# Patient Record
Sex: FEMALE | Race: WHITE | NOT HISPANIC OR LATINO | Employment: UNEMPLOYED | URBAN - METROPOLITAN AREA
[De-identification: names, ages, dates, MRNs, and addresses within clinical notes are randomized per-mention and may not be internally consistent; named-entity substitution may affect disease eponyms.]

---

## 2022-01-10 ENCOUNTER — LAB VISIT (OUTPATIENT)
Dept: PRIMARY CARE CLINIC | Facility: OTHER | Age: 52
End: 2022-01-10
Attending: INTERNAL MEDICINE
Payer: COMMERCIAL

## 2022-01-10 DIAGNOSIS — Z20.822 ENCOUNTER FOR LABORATORY TESTING FOR COVID-19 VIRUS: ICD-10-CM

## 2022-01-10 PROCEDURE — U0003 INFECTIOUS AGENT DETECTION BY NUCLEIC ACID (DNA OR RNA); SEVERE ACUTE RESPIRATORY SYNDROME CORONAVIRUS 2 (SARS-COV-2) (CORONAVIRUS DISEASE [COVID-19]), AMPLIFIED PROBE TECHNIQUE, MAKING USE OF HIGH THROUGHPUT TECHNOLOGIES AS DESCRIBED BY CMS-2020-01-R: HCPCS | Performed by: INTERNAL MEDICINE

## 2022-01-12 ENCOUNTER — PATIENT MESSAGE (OUTPATIENT)
Dept: ADMINISTRATIVE | Facility: OTHER | Age: 52
End: 2022-01-12
Payer: COMMERCIAL

## 2022-01-12 LAB
SARS-COV-2 RNA RESP QL NAA+PROBE: DETECTED
SARS-COV-2- CYCLE NUMBER: 28

## 2022-03-02 ENCOUNTER — PATIENT MESSAGE (OUTPATIENT)
Dept: RESEARCH | Facility: HOSPITAL | Age: 52
End: 2022-03-02
Payer: COMMERCIAL

## 2022-09-06 ENCOUNTER — OFFICE VISIT (OUTPATIENT)
Dept: DERMATOLOGY | Facility: CLINIC | Age: 52
End: 2022-09-06
Payer: COMMERCIAL

## 2022-09-06 DIAGNOSIS — L65.9 ALOPECIA: Primary | ICD-10-CM

## 2022-09-06 PROCEDURE — 99499 UNLISTED E&M SERVICE: CPT | Mod: S$GLB,,, | Performed by: DERMATOLOGY

## 2022-09-06 PROCEDURE — 88312 PR  SPECIAL STAINS,GROUP I: ICD-10-PCS | Mod: 26,,, | Performed by: DERMATOLOGY

## 2022-09-06 PROCEDURE — 88305 TISSUE EXAM BY PATHOLOGIST: CPT | Performed by: DERMATOLOGY

## 2022-09-06 PROCEDURE — 1159F PR MEDICATION LIST DOCUMENTED IN MEDICAL RECORD: ICD-10-PCS | Mod: CPTII,S$GLB,, | Performed by: DERMATOLOGY

## 2022-09-06 PROCEDURE — 88313 PR  SPECIAL STAINS,GROUP II: ICD-10-PCS | Mod: 26,,, | Performed by: DERMATOLOGY

## 2022-09-06 PROCEDURE — 88312 SPECIAL STAINS GROUP 1: CPT | Mod: 26,,, | Performed by: DERMATOLOGY

## 2022-09-06 PROCEDURE — 99999 PR PBB SHADOW E&M-EST. PATIENT-LVL III: ICD-10-PCS | Mod: PBBFAC,,, | Performed by: DERMATOLOGY

## 2022-09-06 PROCEDURE — 88305 TISSUE EXAM BY PATHOLOGIST: CPT | Mod: 26,,, | Performed by: DERMATOLOGY

## 2022-09-06 PROCEDURE — 88313 SPECIAL STAINS GROUP 2: CPT | Mod: 59 | Performed by: DERMATOLOGY

## 2022-09-06 PROCEDURE — 11104 PUNCH BX SKIN SINGLE LESION: CPT | Mod: S$GLB,,, | Performed by: DERMATOLOGY

## 2022-09-06 PROCEDURE — 99499 NO LOS: ICD-10-PCS | Mod: S$GLB,,, | Performed by: DERMATOLOGY

## 2022-09-06 PROCEDURE — 1160F RVW MEDS BY RX/DR IN RCRD: CPT | Mod: CPTII,S$GLB,, | Performed by: DERMATOLOGY

## 2022-09-06 PROCEDURE — 1159F MED LIST DOCD IN RCRD: CPT | Mod: CPTII,S$GLB,, | Performed by: DERMATOLOGY

## 2022-09-06 PROCEDURE — 99999 PR PBB SHADOW E&M-EST. PATIENT-LVL III: CPT | Mod: PBBFAC,,, | Performed by: DERMATOLOGY

## 2022-09-06 PROCEDURE — 1160F PR REVIEW ALL MEDS BY PRESCRIBER/CLIN PHARMACIST DOCUMENTED: ICD-10-PCS | Mod: CPTII,S$GLB,, | Performed by: DERMATOLOGY

## 2022-09-06 PROCEDURE — 88313 SPECIAL STAINS GROUP 2: CPT | Mod: 26,,, | Performed by: DERMATOLOGY

## 2022-09-06 PROCEDURE — 11104 PR PUNCH BIOPSY, SKIN, SINGLE LESION: ICD-10-PCS | Mod: S$GLB,,, | Performed by: DERMATOLOGY

## 2022-09-06 PROCEDURE — 88312 SPECIAL STAINS GROUP 1: CPT | Performed by: DERMATOLOGY

## 2022-09-06 PROCEDURE — 88305 TISSUE EXAM BY PATHOLOGIST: ICD-10-PCS | Mod: 26,,, | Performed by: DERMATOLOGY

## 2022-09-06 RX ORDER — SERTRALINE HYDROCHLORIDE 100 MG/1
200 TABLET, FILM COATED ORAL DAILY
COMMUNITY
Start: 2022-05-17

## 2022-09-06 RX ORDER — TRAZODONE HYDROCHLORIDE 50 MG/1
1 TABLET ORAL NIGHTLY PRN
COMMUNITY
Start: 2022-02-02

## 2022-09-06 RX ORDER — CLINDAMYCIN PHOSPHATE 10 UG/ML
LOTION TOPICAL 2 TIMES DAILY PRN
COMMUNITY
Start: 2022-05-18

## 2022-09-06 RX ORDER — TRAMADOL HYDROCHLORIDE 50 MG/1
50 TABLET ORAL
COMMUNITY
Start: 2021-10-26 | End: 2023-06-01

## 2022-09-06 NOTE — PATIENT INSTRUCTIONS
Punch Biopsy Wound Care    Your doctor has performed a punch biopsy today.  A band aid and antibiotic ointment has been placed over the site.  This should remain in place for 24 hours.  It is recommended that you keep the area dry for the first 24 hours.  After 24 hours, you may remove the band aid and wash the area with warm soap and water and apply Vaseline jelly.  Many patients prefer to use Neosporin or Bacitracin ointment.  This is acceptable; however know that you can develop an allergy to this medication even if you have used it safely for years.  It is important to keep the area moist.  Letting it dry out and get air slows healing time, will worsen the scar, and make it more difficult to remove the stitches if they were placed.  Band aid is optional after first 24 hours.      If you notice increasing redness, tenderness, pain, or yellow drainage at the biopsy or surgical site, please notify your doctor.  These are signs of an infection.    If your biopsy/surgical site is bleeding, apply firm pressure for 15 minutes straight.  Repeat for another 15 minutes, if it is still bleeding.   If the surgical site continues to bleed, then please contact your doctor.

## 2022-09-06 NOTE — PROGRESS NOTES
Subjective:       Patient ID:  Leeanna Leon is a 52 y.o. female who presents for   Chief Complaint   Patient presents with    Hair Loss     History of Present Illness: The patient presents with chief complaint of hair loss.  Location: scalp - mostly top of scalp and temples  Duration: a few years  Signs/Symptoms: thinning; occ itchy bumps; no pain/burning/scaling    Prior treatments:   Minoxidil 2% topical-  no change   Spironolactone 100 mg daily - no change    minoxidil 5% foam- no change    Is interested in oral therapy    +hysterectomy  Denies personal/fam h/o breast/ovarian/uterine cancer in first degree relative    Denies recent surgery, illness, anesthesia, hospitalization, high fever, childbirth   Denies recent weight loss/crash diet  Denies recent start/stop of birth control  Denies history of high tension hairstyles, chemical hair treatment/perm/extensions/weaves; + colors hair  + family history of hair loss  - dad      Denies personal/fam h/o breast/ovarian/uterine cancer in first degree relative        Review of Systems   Skin:  Negative for itching and rash.      Objective:    Physical Exam   Constitutional: She appears well-developed and well-nourished. No distress.   Neurological: She is alert and oriented to person, place, and time. She is not disoriented.   Psychiatric: She has a normal mood and affect.   Skin:   Areas Examined (abnormalities noted in diagram):   Scalp / Hair Palpated and Inspected  Head / Face Inspection Performed            Diagram Legend     Erythematous scaling macule/papule c/w actinic keratosis       Vascular papule c/w angioma      Pigmented verrucoid papule/plaque c/w seborrheic keratosis      Yellow umbilicated papule c/w sebaceous hyperplasia      Irregularly shaped tan macule c/w lentigo     1-2 mm smooth white papules consistent with Milia      Movable subcutaneous cyst with punctum c/w epidermal inclusion cyst      Subcutaneous movable cyst c/w pilar cyst      Firm  pink to brown papule c/w dermatofibroma      Pedunculated fleshy papule(s) c/w skin tag(s)      Evenly pigmented macule c/w junctional nevus     Mildly variegated pigmented, slightly irregular-bordered macule c/w mildly atypical nevus      Flesh colored to evenly pigmented papule c/w intradermal nevus       Pink pearly papule/plaque c/w basal cell carcinoma      Erythematous hyperkeratotic cursted plaque c/w SCC      Surgical scar with no sign of skin cancer recurrence      Open and closed comedones      Inflammatory papules and pustules      Verrucoid papule consistent consistent with wart     Erythematous eczematous patches and plaques     Dystrophic onycholytic nail with subungual debris c/w onychomycosis     Umbilicated papule    Erythematous-base heme-crusted tan verrucoid plaque consistent with inflamed seborrheic keratosis     Erythematous Silvery Scaling Plaque c/w Psoriasis     See annotation                Assessment / Plan:      Pathology Orders:       Normal Orders This Visit    Specimen to Pathology, Dermatology     Comments:    ATTN: DR. ACE    Questions:    Procedure Type: Dermatology and skin neoplasms    Number of Specimens: 2    ------------------------: -------------------------    Spec 1 Procedure: Biopsy Comment - please section vertically    Spec 1 Clinical Impression: [facility] vs [facility]    Spec 1 Source: vertex scalp    ------------------------: -------------------------    Spec 2 Procedure: Biopsy Comment - please section horizontally    Spec 2 Clinical Impression: [facility] vs [facility]PP    Spec 2 Source: vertex scalp    Release to patient:           Alopecia  -     Specimen to Pathology, Dermatology  - refractory to topical minoxidil and oral spironolactone  - discussed options including HairStim topical compounded minoxidil and finasteride  - she is interested in oral therapy; biopsied as below to guide treatment and can consider oral finasteride       Punch biopsy procedure note: x 2  Punch biopsy  performed after verbal consent obtained. Area marked and prepped with alcohol. Approximately 1cc of 1% lidocaine with epinephrine injected. 4 mm disposable punch used to remove lesion. Hemostasis obtained and biopsy site closed with 1 - 2 Prolene sutures. Wound care instructions reviewed with patient and handout given.      Follow up in about 2 weeks (around 9/20/2022) for for nurse visit for SR.

## 2022-09-21 LAB
FINAL PATHOLOGIC DIAGNOSIS: NORMAL
GROSS: NORMAL
Lab: NORMAL
MICROSCOPIC EXAM: NORMAL

## 2022-09-22 NOTE — PROGRESS NOTES
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 Please schedule virtual visit if she would like to discuss starting oral finasteride (Propecia).  OK to overbook for virtual appt for this.      Final Pathologic Diagnosis 1. Skin, vertex scalp, punch biopsy:   -ANDROGENIC ALOPECIA, CONSISTENT WITH, see comment   2.  Skin, vertex scalp, punch biopsy:   -ANDROGENIC ALOPECIA, CONSISTENT WITH, see comment   Comment:  There is a mild scarring component, without robust inflammation.   This likely represents mild scarring due to androgenic alopecia are other   physical cause.  An inflammatory alopecia is not identified.   Comment: Interp By Shanon Resendez M.D., Signed on 09/21/2022 at 08:51  Gross Pathology ID/Patient ID:  [id number]   Received in 2 parts:   Part 1:   Pathology ID/Patient ID:  [id number]   The specimen is received in formalin labeled "vertex scalp".  The specimen   consists of one punch biopsy of tan-yellow skin measuring 0.4 cm in diameter   and is excised to a depth of 0.5 cm .  The specimen is bisected vertically,   inked blue at the resection margin and submitted entirely in cassette   [id number]   Part 2:   Pathology ID/Patient ID:  [id number]   The specimen is received in formalin labeled "vertex scalp".  The specimen   consists of one punch biopsy of tan-yellow skin measuring 0.4 cm in diameter   and is excised to a depth of 0.5 cm .  The specimen is bisected horizontally,    inked red at the resection margin and submitted entirely in cassette   [id number]   Barrera Celaya,   Gross Technologist   Microscopic Exam Vertical and Horizontal sections show skin with an increased vellus hair   follicles, decreased anagen follicles and an increased telogen hair   follicles.  There is a mild perifollicular and perivascular lymphocytic   infiltrate in upper dermis.   There is sebaceous gland hyperplasia.  There is   mild fibrosis and " 1 fibrous tract seen in vertical sections.  No   microorganisms are appreciated on PAS fungal stain with adequate positive   control on blocks 1 and 2.  A preserved elastic she is appreciated on elastic   special stain on blocks 1 and 2.   Total number of hair follicles in biopsy: 30   Total number of hair follicles per square millimeter:  2.3 (total/12.6)mm   Number of terminal hair follicles:11   Number of vellus hairs (vellus + miniaturized hairs):17   Number of undetermined follicles:0   Terminal:vellus hair ratio (T:V): terminal hair follicles / vellus:11/19   Number of terminal hair follicles in anagen:6   Number of terminal hair follicles in telogen:4   Number of terminal hair follicles in catagen:1   Telogen count: terminal telogen hairs (telogen + catagen) / terminal hair   follicles:5/6   Presence, type and localization of Inflammatory cell infiltrate: lymphocytic   mild perifollicular and intersitial   Scarring: Present, mild   Pigmented casts: absent   Alopecia: mild to moderate   Disclaimer Unless the case is a 'gross only' or additional testing only, the final   diagnosis for each specimen is based on a microscopic examination of   appropriate tissue sections.

## 2024-01-09 ENCOUNTER — OFFICE VISIT (OUTPATIENT)
Dept: OTOLARYNGOLOGY | Facility: CLINIC | Age: 54
End: 2024-01-09
Payer: COMMERCIAL

## 2024-01-09 VITALS — WEIGHT: 129 LBS | HEIGHT: 60 IN | BODY MASS INDEX: 25.32 KG/M2

## 2024-01-09 DIAGNOSIS — H91.93 SUBJECTIVE HEARING CHANGE OF BOTH EARS: ICD-10-CM

## 2024-01-09 DIAGNOSIS — J30.89 NON-SEASONAL ALLERGIC RHINITIS, UNSPECIFIED TRIGGER: ICD-10-CM

## 2024-01-09 DIAGNOSIS — H69.93 DYSFUNCTION OF BOTH EUSTACHIAN TUBES: Primary | ICD-10-CM

## 2024-01-09 PROCEDURE — 3008F BODY MASS INDEX DOCD: CPT | Mod: CPTII,S$GLB,, | Performed by: OTOLARYNGOLOGY

## 2024-01-09 PROCEDURE — 1159F MED LIST DOCD IN RCRD: CPT | Mod: CPTII,S$GLB,, | Performed by: OTOLARYNGOLOGY

## 2024-01-09 PROCEDURE — 99999 PR PBB SHADOW E&M-EST. PATIENT-LVL III: CPT | Mod: PBBFAC,,, | Performed by: OTOLARYNGOLOGY

## 2024-01-09 PROCEDURE — 99203 OFFICE O/P NEW LOW 30 MIN: CPT | Mod: S$GLB,,, | Performed by: OTOLARYNGOLOGY

## 2024-01-09 RX ORDER — FLUTICASONE PROPIONATE 50 MCG
2 SPRAY, SUSPENSION (ML) NASAL DAILY
Qty: 16 G | Refills: 5 | Status: SHIPPED | OUTPATIENT
Start: 2024-01-09

## 2024-01-09 NOTE — PROGRESS NOTES
"Referring Provider:    Self, Aaareferral  No address on file  Subjective:   Patient: Leeanna Leon [id number], :1970   Visit date:2024 9:05 AM    Chief Complaint:  Other (Pt claims she has fluid that moves from one ear to the other for the past year )    HPI:    Prior notes reviewed by myself.  Clinical documentation obtained by nursing staff reviewed.     53-year-old female presents for evaluation of bilateral aural fullness and occasional muffled hearing.  She feels that she has fluid that moves from 1 ear to the other ear at times.  She does have some allergic rhinitis symptoms such as rhinorrhea, congestion, sneezing.  She is currently not using an antihistamine or nasal spray on a regular basis.  No recent audiological testing or imaging.  No significant prior otologic history.        Objective:     Physical Exam:  Vitals:  Ht 5' (1.524 m)   Wt 58.5 kg (128 lb 15.5 oz)   BMI 25.19 kg/m²   General appearance:  Well developed, well nourished    Ears:  Otoscopy of external auditory canals and tympanic membranes was normal, clinical speech reception thresholds grossly intact, no mass/lesion of auricle.    Nose:  No masses/lesions of external nose, nasal mucosa, septum, and turbinates were within normal limits.    Mouth:  No mass/lesion of lips, teeth, gums, hard/soft palate, tongue, tonsils, or oropharynx.    Neck & Lymphatics:  No cervical lymphadenopathy, no neck mass/crepitus/ asymmetry, trachea is midline, no thyroid enlargement/tenderness/mass.        [x]  Data Reviewed:    No results found for: "WBC", "HGB", "HCT", "MCV", "LABPLAT", "EOSINOPHIL"          Assessment & Plan:   Dysfunction of both eustachian tubes  -     fluticasone propionate (FLONASE) 50 mcg/actuation nasal spray; 2 sprays (100 mcg total) by Each Nostril route once daily.  Dispense: 16 g; Refill: 5    Non-seasonal allergic rhinitis, unspecified trigger    Subjective hearing change of both ears        Her exam is essentially normal " today, but her history is consistent with Eustachian tube dysfunction.  We discussed conservative management of this condition as noted below and I recommended that we start her on fluticasone nasal spray on a daily basis.  We also will move forward with a formal audiogram to rule out any other possible etiologies for her symptoms    Dr. Hickman's Eustachian Tube Dysfunction Protocol          Day 1-3 (Perform 2x per day)     Afrin (pump spray mist OTC) 2 sprays in each nostril   Fluticasone nasal spray 2 sprays in each nostril      Days 4 - follow up visit (perform 1x per day)    Fluticasone nasal spray 2 sprays in each nostril    *Buy the OTC Afrin pump spray mist - it should be in the allergy/cold/sinus section of the pharmacy  *Autoinsufflate (POP your ears gently!) at least 4 times per day until your follow up appointment    *TAKE ALL OTHER MEDICATIONS AS DIRECTED BY DR HICKMAN    *MAKE SURE YOU STOP USING AFRIN AFTER THE 3RD DAY AS THERE IS A RISK OF BECOMING DEPENDENT ON THAT MEDICATION    Conservative Treatment of Eustachian Tube Dysfunction    Your physician has diagnosed you with eustachian tube dysfunction.  There are several conservative medical treatments that have been shown to help improve the function of your eustachian tube.    Topical Decongestant Spray (Afrin):  2 sprays in each nostril twice daily for 3 days.  It is important to stop this medication after 3 days as it can be habit-forming.  Buy the Afrin pump spray mist.  This usually comes in a red and white box over the counter.  Autoinsufflation (popping the ears):  Recommend gentle popping of the ears 3-4 times per day and as needed for symptom relief.  Oral Steroids:  Your physician may or may not recommend a short course of oral steroids.  If so, take as directed.  These help decrease the inflammation in your nasal/sinus cavity as well as around your eustachian tube which may help with your symptoms  Nasal Steroids:  Most commonly fluticasone 2  sprays in each nostril once daily.  This will help decrease the inflammation in your nasal/sinus cavity as well as around your eustachian tube which may help with your symptoms    If no improvement after 2 weeks of conservative treatment, your physician may recommend a myringotomy and or ear tube placement for symptom relief.

## 2024-01-10 ENCOUNTER — CLINICAL SUPPORT (OUTPATIENT)
Dept: AUDIOLOGY | Facility: CLINIC | Age: 54
End: 2024-01-10
Payer: COMMERCIAL

## 2024-01-10 DIAGNOSIS — H90.3 SENSORINEURAL HEARING LOSS, BILATERAL: Primary | ICD-10-CM

## 2024-01-10 PROCEDURE — 92567 TYMPANOMETRY: CPT | Mod: S$GLB,,, | Performed by: AUDIOLOGIST-HEARING AID FITTER

## 2024-01-10 PROCEDURE — 99999 PR PBB SHADOW E&M-EST. PATIENT-LVL I: CPT | Mod: PBBFAC,,, | Performed by: AUDIOLOGIST-HEARING AID FITTER

## 2024-01-10 PROCEDURE — 92557 COMPREHENSIVE HEARING TEST: CPT | Mod: S$GLB,,, | Performed by: AUDIOLOGIST-HEARING AID FITTER

## 2024-01-10 NOTE — PROGRESS NOTES
Referring provider: Dr. Hickman    Leeanna Edward was seen 01/10/2024 for an audiological evaluation.  Patient complains of ear pressure and decreased hearing, right poorer than left. She has family history of hearing loss (father) and recreational music exposure history. Patient has appreciated difficulty understanding in background noise for some time. Seen by ENT yesterday.      Results reveal a normal-to-profound sensorineural hearing loss 250-8000 Hz for the right ear, and a normal-to-profound sensorineural hearing loss 250-8000 Hz for the left ear.   Speech Reception Thresholds were 20 dBHL for the right ear and 20 dBHL for the left ear.   Word recognition scores were excellent for the right ear and excellent for the left ear.   Tympanograms were Type A for the right ear and Type A for the left ear.    Patient was counseled on the above findings.    Recommendations:  ENT review  Continue with Dr. Hickman's ETD protocol  Audiogram every two years to monitor hearing loss, or sooner if any perceived changes in hearing.